# Patient Record
Sex: FEMALE | Race: WHITE | NOT HISPANIC OR LATINO | URBAN - METROPOLITAN AREA
[De-identification: names, ages, dates, MRNs, and addresses within clinical notes are randomized per-mention and may not be internally consistent; named-entity substitution may affect disease eponyms.]

---

## 2017-09-27 LAB
EXTERNAL ABO GROUPING: NORMAL
EXTERNAL CHLAMYDIA SCREEN: NEGATIVE
EXTERNAL GONORRHEA SCREEN: NEGATIVE
EXTERNAL HEMATOCRIT: 36.5 %
EXTERNAL HEMOGLOBIN: 12.6 G/DL
EXTERNAL HEPATITIS B SURFACE ANTIGEN: NORMAL
EXTERNAL HIV-1 ANTIBODY: NEGATIVE
EXTERNAL PLATELET COUNT: 310 K/ΜL
EXTERNAL RH FACTOR: NEGATIVE
EXTERNAL RUBELLA IGG QUANTITATION: NORMAL
EXTERNAL SYPHILIS RPR SCREEN: NORMAL

## 2018-03-09 PROCEDURE — 87653 STREP B DNA AMP PROBE: CPT | Performed by: OBSTETRICS & GYNECOLOGY

## 2018-03-10 ENCOUNTER — LAB REQUISITION (OUTPATIENT)
Dept: LAB | Facility: HOSPITAL | Age: 33
End: 2018-03-10
Payer: COMMERCIAL

## 2018-03-10 DIAGNOSIS — Z34.83 ENCOUNTER FOR SUPERVISION OF OTHER NORMAL PREGNANCY, THIRD TRIMESTER: ICD-10-CM

## 2018-03-11 LAB — GP B STREP DNA SPEC QL NAA+PROBE: ABNORMAL

## 2018-04-06 ENCOUNTER — HOSPITAL ENCOUNTER (INPATIENT)
Facility: HOSPITAL | Age: 33
LOS: 1 days | Discharge: HOME | DRG: 781 | End: 2018-04-06
Attending: OBSTETRICS & GYNECOLOGY | Admitting: OBSTETRICS & GYNECOLOGY
Payer: COMMERCIAL

## 2018-04-06 VITALS
TEMPERATURE: 98.1 F | RESPIRATION RATE: 18 BRPM | SYSTOLIC BLOOD PRESSURE: 120 MMHG | DIASTOLIC BLOOD PRESSURE: 82 MMHG | HEART RATE: 71 BPM

## 2018-04-06 DIAGNOSIS — Z3A.40 40 WEEKS GESTATION OF PREGNANCY: Primary | ICD-10-CM

## 2018-04-06 PROBLEM — O26.899 RH NEGATIVE STATE IN ANTEPARTUM PERIOD: Status: ACTIVE | Noted: 2018-04-06

## 2018-04-06 PROBLEM — Z34.93 PRENATAL CARE IN THIRD TRIMESTER: Status: ACTIVE | Noted: 2018-04-06

## 2018-04-06 PROBLEM — Z67.91 RH NEGATIVE STATE IN ANTEPARTUM PERIOD: Status: ACTIVE | Noted: 2018-04-06

## 2018-04-06 LAB
ABO GROUP BLD: NORMAL
BASOPHILS # BLD AUTO: 0.02 THOUSANDS/ΜL (ref 0–0.1)
BASOPHILS NFR BLD AUTO: 0 % (ref 0–1)
BLD GP AB SCN SERPL QL: NEGATIVE
EOSINOPHIL # BLD AUTO: 0.11 THOUSAND/ΜL (ref 0–0.61)
EOSINOPHIL NFR BLD AUTO: 1 % (ref 0–6)
ERYTHROCYTE [DISTWIDTH] IN BLOOD BY AUTOMATED COUNT: 15 % (ref 11.6–15.1)
EXTERNAL GROUP B STREP ANTIGEN: POSITIVE
HCT VFR BLD AUTO: 33.8 % (ref 34.8–46.1)
HGB BLD-MCNC: 11.6 G/DL (ref 11.5–15.4)
LYMPHOCYTES # BLD AUTO: 1.84 THOUSANDS/ΜL (ref 0.6–4.47)
LYMPHOCYTES NFR BLD AUTO: 22 % (ref 14–44)
MCH RBC QN AUTO: 28.3 PG (ref 26.8–34.3)
MCHC RBC AUTO-ENTMCNC: 34.3 G/DL (ref 31.4–37.4)
MCV RBC AUTO: 82 FL (ref 82–98)
MONOCYTES # BLD AUTO: 0.59 THOUSAND/ΜL (ref 0.17–1.22)
MONOCYTES NFR BLD AUTO: 7 % (ref 4–12)
NEUTROPHILS # BLD AUTO: 5.73 THOUSANDS/ΜL (ref 1.85–7.62)
NEUTS SEG NFR BLD AUTO: 70 % (ref 43–75)
PLATELET # BLD AUTO: 228 THOUSANDS/UL (ref 149–390)
PMV BLD AUTO: 11 FL (ref 8.9–12.7)
RBC # BLD AUTO: 4.1 MILLION/UL (ref 3.81–5.12)
RH BLD: NEGATIVE
SPECIMEN EXPIRATION DATE: NORMAL
WBC # BLD AUTO: 8.29 THOUSAND/UL (ref 4.31–10.16)

## 2018-04-06 PROCEDURE — 86900 BLOOD TYPING SEROLOGIC ABO: CPT | Performed by: OBSTETRICS & GYNECOLOGY

## 2018-04-06 PROCEDURE — 85025 COMPLETE CBC W/AUTO DIFF WBC: CPT | Performed by: OBSTETRICS & GYNECOLOGY

## 2018-04-06 PROCEDURE — 86850 RBC ANTIBODY SCREEN: CPT | Performed by: OBSTETRICS & GYNECOLOGY

## 2018-04-06 PROCEDURE — 86592 SYPHILIS TEST NON-TREP QUAL: CPT | Performed by: OBSTETRICS & GYNECOLOGY

## 2018-04-06 PROCEDURE — 3E0P7VZ INTRODUCTION OF HORMONE INTO FEMALE REPRODUCTIVE, VIA NATURAL OR ARTIFICIAL OPENING: ICD-10-PCS | Performed by: OBSTETRICS & GYNECOLOGY

## 2018-04-06 PROCEDURE — 3E033VJ INTRODUCTION OF OTHER HORMONE INTO PERIPHERAL VEIN, PERCUTANEOUS APPROACH: ICD-10-PCS | Performed by: OBSTETRICS & GYNECOLOGY

## 2018-04-06 PROCEDURE — 86901 BLOOD TYPING SEROLOGIC RH(D): CPT | Performed by: OBSTETRICS & GYNECOLOGY

## 2018-04-06 RX ORDER — OXYCODONE HYDROCHLORIDE AND ACETAMINOPHEN 5; 325 MG/1; MG/1
2 TABLET ORAL ONCE
Status: COMPLETED | OUTPATIENT
Start: 2018-04-06 | End: 2018-04-06

## 2018-04-06 RX ORDER — OXYTOCIN/RINGER'S LACTATE 30/500 ML
1-30 PLASTIC BAG, INJECTION (ML) INTRAVENOUS
Status: DISCONTINUED | OUTPATIENT
Start: 2018-04-06 | End: 2018-04-07 | Stop reason: HOSPADM

## 2018-04-06 RX ORDER — BUTORPHANOL TARTRATE 1 MG/ML
1 INJECTION, SOLUTION INTRAMUSCULAR; INTRAVENOUS ONCE
Status: COMPLETED | OUTPATIENT
Start: 2018-04-06 | End: 2018-04-06

## 2018-04-06 RX ORDER — ACETAMINOPHEN 325 MG/1
650 TABLET ORAL EVERY 6 HOURS PRN
Status: DISCONTINUED | OUTPATIENT
Start: 2018-04-06 | End: 2018-04-07 | Stop reason: HOSPADM

## 2018-04-06 RX ORDER — SODIUM CHLORIDE, SODIUM LACTATE, POTASSIUM CHLORIDE, CALCIUM CHLORIDE 600; 310; 30; 20 MG/100ML; MG/100ML; MG/100ML; MG/100ML
125 INJECTION, SOLUTION INTRAVENOUS CONTINUOUS
Status: DISCONTINUED | OUTPATIENT
Start: 2018-04-06 | End: 2018-04-07 | Stop reason: HOSPADM

## 2018-04-06 RX ADMIN — Medication 2 MILLI-UNITS/MIN: at 14:55

## 2018-04-06 RX ADMIN — SODIUM CHLORIDE, SODIUM LACTATE, POTASSIUM CHLORIDE, AND CALCIUM CHLORIDE 125 ML/HR: .6; .31; .03; .02 INJECTION, SOLUTION INTRAVENOUS at 18:43

## 2018-04-06 RX ADMIN — SODIUM CHLORIDE 5 MILLION UNITS: 0.9 INJECTION, SOLUTION INTRAVENOUS at 10:04

## 2018-04-06 RX ADMIN — BUTORPHANOL TARTRATE 1 MG: 1 INJECTION, SOLUTION INTRAMUSCULAR; INTRAVENOUS at 17:09

## 2018-04-06 RX ADMIN — OXYCODONE HYDROCHLORIDE AND ACETAMINOPHEN 2 TABLET: 5; 325 TABLET ORAL at 21:59

## 2018-04-06 RX ADMIN — SODIUM CHLORIDE, SODIUM LACTATE, POTASSIUM CHLORIDE, AND CALCIUM CHLORIDE 125 ML/HR: .6; .31; .03; .02 INJECTION, SOLUTION INTRAVENOUS at 09:20

## 2018-04-06 RX ADMIN — SODIUM CHLORIDE 2.5 MILLION UNITS: 9 INJECTION, SOLUTION INTRAVENOUS at 17:29

## 2018-04-06 RX ADMIN — ACETAMINOPHEN 650 MG: 325 TABLET, FILM COATED ORAL at 09:44

## 2018-04-06 RX ADMIN — SODIUM CHLORIDE 2.5 MILLION UNITS: 9 INJECTION, SOLUTION INTRAVENOUS at 13:30

## 2018-04-06 RX ADMIN — MISOPROSTOL 25 MCG: 100 TABLET ORAL at 10:55

## 2018-04-06 NOTE — OB LABOR/OXYTOCIN SAFETY PROGRESS
Labor Progress Note - Silvia Bush 28 y o  female MRN: [id number]    Unit/Bed#: L&D 323-01 Encounter: [id number]    Obstetric History       T1      L1     SAB0   TAB0   Ectopic0   Multiple0   Live Births0      Gestational Age: 36w2d  Dose (roberta-units/min) Oxytocin: 4 roberta-units/min  Contraction Frequency (minutes): 2  Contraction Quality: Moderate  Tachysystole: No   Dilation: 2        Effacement (%): 70  Station: -2  Baseline Rate: 125 bpm  FHR Category: Category I     Oxytocin Safety Progress Check: Safety check completed    Notes/comments:   Patient starting to feel contractions more strongly  Discussed pain management options including risks, benefits and alternatives and is requesting stadol at this time  FHT is category I  Cervical check the same at this time  Will give 1mg stadol IV once and continue pitocin titration per protocol      D/w Dr Eulogio Ortiz MD 2018 4:39 PM

## 2018-04-06 NOTE — OB LABOR/OXYTOCIN SAFETY PROGRESS
Labor progress note - Kamini Javed 28 y o  female MRN: [id number]    Unit/Bed#: L&D 323-01 Encounter: [id number]    Obstetric History       T1      L1     SAB0   TAB0   Ectopic0   Multiple0   Live Births0      Gestational Age: 36w2d     Contraction Frequency (minutes): 3-4  Contraction Quality: Mild  Tachysystole: No   Dilation: 2        Effacement (%): 70  Station: -2  Baseline Rate: 135 bpm  FHR Category: Category I     Notes/comments:   Patient is doing well- feeling contractions as tightening but does not want anything for pain at this time  Will start pitocin titration 2x2 milliunits Q 20 minutes per protocol  Will continue top monitor closely  Discussed with Dr Nicole Le MD 2018 2:25 PM

## 2018-04-06 NOTE — OB LABOR/OXYTOCIN SAFETY PROGRESS
Labor Progress Note - Keny Clonts 28 y o  female MRN: [id number]    Unit/Bed#: L&D 323-01 Encounter: [id number]    Obstetric History       T1      L1     SAB0   TAB0   Ectopic0   Multiple0   Live Births0      Gestational Age: 36w2d     Contraction Frequency (minutes): Irregular  Contraction Quality: Mild  Tachysystole: No   Dilation: 1-2        Effacement (%): 50  Station: -2  Baseline Rate: 140 bpm  Fetal Heart Rate: 140 BPM  FHR Category: Category I          Notes/comments:   Discussed low-dose pitocin for further ripening vs  Cytotec  Pt requests cytotec and repeat evaluation in 3 hours  Cytotec placed              Tiana Mcdonald DO 2018 10:58 AM

## 2018-04-06 NOTE — H&P
History & Physical - OB/GYN   Verenice Wheeler 28 y o  female MRN: [id number]  Unit/Bed#: L&D 323-01 Encounter: [id number]    86 y o   at 40w4d weeks  She is a patient of [facility]    Chief complaint:  I'm here for my induction    HPI:  Contractions:  Occasional [place] contractions  Fetal movement:  yes  Vaginal bleeding:   no  Leaking of fluid:  no      Pregnancy Complications:  Patient Active Problem List   Diagnosis    Prenatal care in third trimester    40 weeks gestation of pregnancy    Rh negative state in antepartum period       PMH:  No past medical history on file  PSH:  No past surgical history on file  OB Hx:  Obstetric History       T1      L1     SAB0   TAB0   Ectopic0   Multiple0   Live Births0       # Outcome Date GA Lbr Cristóbal/2nd Weight Sex Delivery Anes PTL Lv   2 Current            1 Term                   Meds:  No current facility-administered medications on file prior to encounter  No current outpatient prescriptions on file prior to encounter  Allergies:  No Known Allergies    Labs:  Blood type: O-  Antibody: unknown  Group B strep: positive  HIV: negative  Hepatitis B: negative  RPR: Nonreactive  Rubella: Not immune  Varicella Unknown  1 hour Glucose: Unknown    Physical Exam:  LMP 2017 (LMP Unknown)   Breastfeeding? Yes       Gen: AaOx3, NAD, pleasant  Pulm: No increased work of breathing  Abd: Gravid, nontender, nondistended  Extremities: No edema, nontender, Negative Olena's bilaterally    Estimated Fetal Weight: 8 lbs  Presentation: Vertex by ultrasound    SVE: 1-2 / 40% / -2  FHT:  135 / Moderate 6 - 25 bpm / +accels, -decels  [place]: Rare and irregular    Membranes: Intact    Assessment:   28 y o   at 40w4d weeks, here for elective induction of labor    Plan:   1  Admit to L&D  2  CBC, RPR, type and screen  3  GBS positive, will start PCN for GBS prophylaxis  4  Cytotec for cervical ripening  5  Clear liquid diet  6  Epidural upon request    Discussed with Dr Bartolome Phillip DO  OB/GYN, PGY2  4/6/2018, 10:47 AM

## 2018-04-07 ENCOUNTER — ANESTHESIA (INPATIENT)
Dept: LABOR AND DELIVERY | Facility: HOSPITAL | Age: 33
End: 2018-04-07
Payer: COMMERCIAL

## 2018-04-07 ENCOUNTER — HOSPITAL ENCOUNTER (INPATIENT)
Facility: HOSPITAL | Age: 33
LOS: 3 days | Discharge: HOME | End: 2018-04-10
Attending: OBSTETRICS & GYNECOLOGY | Admitting: OBSTETRICS & GYNECOLOGY
Payer: COMMERCIAL

## 2018-04-07 ENCOUNTER — ANESTHESIA EVENT (INPATIENT)
Dept: LABOR AND DELIVERY | Facility: HOSPITAL | Age: 33
End: 2018-04-07
Payer: COMMERCIAL

## 2018-04-07 DIAGNOSIS — Z3A.40 40 WEEKS GESTATION OF PREGNANCY: Primary | ICD-10-CM

## 2018-04-07 LAB
ABO GROUP BLD: NORMAL
BLD GP AB SCN SERPL QL: NEGATIVE
RH BLD: NEGATIVE
SPECIMEN EXPIRATION DATE: NORMAL

## 2018-04-07 PROCEDURE — 86901 BLOOD TYPING SEROLOGIC RH(D): CPT | Performed by: OBSTETRICS & GYNECOLOGY

## 2018-04-07 PROCEDURE — 4A1HXCZ MONITORING OF PRODUCTS OF CONCEPTION, CARDIAC RATE, EXTERNAL APPROACH: ICD-10-PCS | Performed by: OBSTETRICS & GYNECOLOGY

## 2018-04-07 PROCEDURE — 86850 RBC ANTIBODY SCREEN: CPT | Performed by: OBSTETRICS & GYNECOLOGY

## 2018-04-07 PROCEDURE — 99212 OFFICE O/P EST SF 10 MIN: CPT

## 2018-04-07 PROCEDURE — 86900 BLOOD TYPING SEROLOGIC ABO: CPT | Performed by: OBSTETRICS & GYNECOLOGY

## 2018-04-07 RX ORDER — ROPIVACAINE HYDROCHLORIDE 5 MG/ML
INJECTION, SOLUTION EPIDURAL; INFILTRATION; PERINEURAL AS NEEDED
Status: DISCONTINUED | OUTPATIENT
Start: 2018-04-07 | End: 2018-04-08 | Stop reason: SURG

## 2018-04-07 RX ORDER — ROPIVACAINE HYDROCHLORIDE 2 MG/ML
INJECTION, SOLUTION EPIDURAL; INFILTRATION; PERINEURAL AS NEEDED
Status: DISCONTINUED | OUTPATIENT
Start: 2018-04-07 | End: 2018-04-08 | Stop reason: SURG

## 2018-04-07 RX ORDER — LIDOCAINE HYDROCHLORIDE AND EPINEPHRINE 15; 5 MG/ML; UG/ML
INJECTION, SOLUTION EPIDURAL AS NEEDED
Status: DISCONTINUED | OUTPATIENT
Start: 2018-04-07 | End: 2018-04-08 | Stop reason: SURG

## 2018-04-07 RX ORDER — LIDOCAINE HYDROCHLORIDE AND EPINEPHRINE 20; 5 MG/ML; UG/ML
INJECTION, SOLUTION EPIDURAL; INFILTRATION; INTRACAUDAL; PERINEURAL AS NEEDED
Status: DISCONTINUED | OUTPATIENT
Start: 2018-04-07 | End: 2018-04-08 | Stop reason: SURG

## 2018-04-07 RX ORDER — FENTANYL CITRATE 50 UG/ML
INJECTION, SOLUTION INTRAMUSCULAR; INTRAVENOUS
Status: COMPLETED
Start: 2018-04-07 | End: 2018-04-07

## 2018-04-07 RX ORDER — SODIUM CHLORIDE, SODIUM LACTATE, POTASSIUM CHLORIDE, CALCIUM CHLORIDE 600; 310; 30; 20 MG/100ML; MG/100ML; MG/100ML; MG/100ML
125 INJECTION, SOLUTION INTRAVENOUS CONTINUOUS
Status: DISCONTINUED | OUTPATIENT
Start: 2018-04-07 | End: 2018-04-08

## 2018-04-07 RX ORDER — FENTANYL CITRATE 50 UG/ML
INJECTION, SOLUTION INTRAMUSCULAR; INTRAVENOUS AS NEEDED
Status: DISCONTINUED | OUTPATIENT
Start: 2018-04-07 | End: 2018-04-08 | Stop reason: SURG

## 2018-04-07 RX ORDER — ONDANSETRON 2 MG/ML
4 INJECTION INTRAMUSCULAR; INTRAVENOUS EVERY 6 HOURS PRN
Status: DISCONTINUED | OUTPATIENT
Start: 2018-04-07 | End: 2018-04-08

## 2018-04-07 RX ORDER — OXYTOCIN/RINGER'S LACTATE 30/500 ML
1-30 PLASTIC BAG, INJECTION (ML) INTRAVENOUS
Status: DISCONTINUED | OUTPATIENT
Start: 2018-04-07 | End: 2018-04-08

## 2018-04-07 RX ADMIN — SODIUM CHLORIDE 5 MILLION UNITS: 0.9 INJECTION, SOLUTION INTRAVENOUS at 01:55

## 2018-04-07 RX ADMIN — ROPIVACAINE HYDROCHLORIDE 7 ML: 5 INJECTION, SOLUTION EPIDURAL; INFILTRATION; PERINEURAL at 20:02

## 2018-04-07 RX ADMIN — LIDOCAINE HYDROCHLORIDE,EPINEPHRINE BITARTRATE 5 ML: 20; .005 INJECTION, SOLUTION EPIDURAL; INFILTRATION; INTRACAUDAL; PERINEURAL at 14:49

## 2018-04-07 RX ADMIN — SODIUM CHLORIDE 2.5 MILLION UNITS: 9 INJECTION, SOLUTION INTRAVENOUS at 22:12

## 2018-04-07 RX ADMIN — SODIUM CHLORIDE, SODIUM LACTATE, POTASSIUM CHLORIDE, AND CALCIUM CHLORIDE 999 ML/HR: .6; .31; .03; .02 INJECTION, SOLUTION INTRAVENOUS at 01:23

## 2018-04-07 RX ADMIN — ROPIVACAINE HYDROCHLORIDE 6 ML: 2 INJECTION, SOLUTION EPIDURAL; INFILTRATION at 02:30

## 2018-04-07 RX ADMIN — ROPIVACAINE HYDROCHLORIDE 7 ML: 5 INJECTION, SOLUTION EPIDURAL; INFILTRATION; PERINEURAL at 22:35

## 2018-04-07 RX ADMIN — ROPIVACAINE HYDROCHLORIDE: 2 INJECTION, SOLUTION EPIDURAL; INFILTRATION at 17:38

## 2018-04-07 RX ADMIN — SODIUM CHLORIDE 2.5 MILLION UNITS: 9 INJECTION, SOLUTION INTRAVENOUS at 10:30

## 2018-04-07 RX ADMIN — FENTANYL CITRATE 50 MCG: 50 INJECTION, SOLUTION INTRAMUSCULAR; INTRAVENOUS at 23:00

## 2018-04-07 RX ADMIN — ROPIVACAINE HYDROCHLORIDE 5 ML: 2 INJECTION, SOLUTION EPIDURAL; INFILTRATION at 02:25

## 2018-04-07 RX ADMIN — SODIUM CHLORIDE, SODIUM LACTATE, POTASSIUM CHLORIDE, AND CALCIUM CHLORIDE 999 ML/HR: .6; .31; .03; .02 INJECTION, SOLUTION INTRAVENOUS at 02:33

## 2018-04-07 RX ADMIN — ONDANSETRON 4 MG: 2 INJECTION INTRAMUSCULAR; INTRAVENOUS at 19:55

## 2018-04-07 RX ADMIN — ROPIVACAINE HYDROCHLORIDE: 2 INJECTION, SOLUTION EPIDURAL; INFILTRATION at 02:32

## 2018-04-07 RX ADMIN — LIDOCAINE HYDROCHLORIDE AND EPINEPHRINE 3 ML: 15; 5 INJECTION, SOLUTION EPIDURAL at 02:22

## 2018-04-07 RX ADMIN — SODIUM CHLORIDE 2.5 MILLION UNITS: 9 INJECTION, SOLUTION INTRAVENOUS at 14:22

## 2018-04-07 RX ADMIN — SODIUM CHLORIDE 2.5 MILLION UNITS: 9 INJECTION, SOLUTION INTRAVENOUS at 17:50

## 2018-04-07 RX ADMIN — SODIUM CHLORIDE, SODIUM LACTATE, POTASSIUM CHLORIDE, AND CALCIUM CHLORIDE 125 ML/HR: .6; .31; .03; .02 INJECTION, SOLUTION INTRAVENOUS at 08:10

## 2018-04-07 RX ADMIN — Medication 2 MILLI-UNITS/MIN: at 10:32

## 2018-04-07 RX ADMIN — FENTANYL CITRATE 50 MCG: 50 INJECTION, SOLUTION INTRAMUSCULAR; INTRAVENOUS at 20:02

## 2018-04-07 RX ADMIN — LIDOCAINE HYDROCHLORIDE,EPINEPHRINE BITARTRATE 5 ML: 20; .005 INJECTION, SOLUTION EPIDURAL; INFILTRATION; INTRACAUDAL; PERINEURAL at 17:55

## 2018-04-07 RX ADMIN — ONDANSETRON 4 MG: 2 INJECTION INTRAMUSCULAR; INTRAVENOUS at 03:05

## 2018-04-07 RX ADMIN — SODIUM CHLORIDE 2.5 MILLION UNITS: 9 INJECTION, SOLUTION INTRAVENOUS at 06:23

## 2018-04-07 RX ADMIN — SODIUM CHLORIDE, SODIUM LACTATE, POTASSIUM CHLORIDE, AND CALCIUM CHLORIDE 125 ML/HR: .6; .31; .03; .02 INJECTION, SOLUTION INTRAVENOUS at 16:50

## 2018-04-07 RX ADMIN — ROPIVACAINE HYDROCHLORIDE: 2 INJECTION, SOLUTION EPIDURAL; INFILTRATION at 11:05

## 2018-04-07 NOTE — OB LABOR/OXYTOCIN SAFETY PROGRESS
Oxytocin Safety Progress Check Note - Renu Marshall 28 y o  female MRN: [id number]    Unit/Bed#: L&D 323-01 Encounter: [id number]    Obstetric History       T1      L1     SAB0   TAB0   Ectopic0   Multiple0   Live Births0      Gestational Age: 39w6d  Dose (roberta-units/min) Oxytocin: 22 roberta-units/min  Contraction Frequency (minutes): 2-3  Contraction Quality: Moderate  Tachysystole: No   Dilation: 5-6        Effacement (%): 90  Station: -1  Baseline Rate: 145 bpm  Fetal Heart Rate: 140 BPM  FHR Category: Category I          Notes/comments:     She is making cervical change with pit at 22mU/min  She is shaky and feels more pressure now  Fetal vertex has dropped in station from -2 to -1  Will continue pit at 22mU/min and check in 2 hours  FHT shows baseline of 150 with intermittent variable decelerations, intermittent early decelerations, moderate variability, and 15x15 accelerations  Will continue to closely monitor      D/w Dr Lamberto Jorge MD 2018 4:45 PM

## 2018-04-07 NOTE — OB LABOR/OXYTOCIN SAFETY PROGRESS
Oxytocin Safety Progress Check Note - Brittaney Marroquin 28 y o  female MRN: [id number]    Unit/Bed#: L&D 323-01 Encounter: [id number]    Obstetric History       T1      L1     SAB0   TAB0   Ectopic0   Multiple0   Live Births0      Gestational Age: 36w2d  Dose (roberta-units/min) Oxytocin: 14 roberta-units/min  Contraction Frequency (minutes): 1-2  Contraction Quality: Mild  Tachysystole: Yes   Dilation: 2-3        Effacement (%): 50  Station: -2  Baseline Rate: 120 bpm  Fetal Heart Rate: 140 BPM  FHR Category: Category I     Oxytocin Safety Progress Check: Safety check completed    Notes/comments:     Patient getting more uncomfortable, but making minimal cervical change  Will consider another dose of cytotec vs continued pitocin titration  Patient was tachysystole, so pitocin was turned down              Rose Chandler MD 2018 8:24 PM

## 2018-04-07 NOTE — OB LABOR/OXYTOCIN SAFETY PROGRESS
Labor Progress Note - Keny Guzmants 28 y o  female MRN: [id number]    Unit/Bed#: L&D 323-01 Encounter: [id number]    Obstetric History       T1      L1     SAB0   TAB0   Ectopic0   Multiple0   Live Births0      Gestational Age: 39w6d     Contraction Frequency (minutes): 2 5-6 5  Contraction Quality: Mild  Tachysystole: No   Dilation: 4        Effacement (%): 70  Station: -2  Baseline Rate: 130 bpm  Fetal Heart Rate: 140 BPM  FHR Category: Category I          Late entry due to events on floor    Notes/comments:     Patient had a four minute prolonged deceleration at 0745 that resolved with maternal repositioning  She did not make cervical change  Pitocin order was placed at 0715 as she has not made cervical change since 0600 but was not started  Patient then had another 3 minute prolonged deceleration at 0850 that resolved with maternal repositioning only  Will hold off on starting pitocin given intermittent decelerations  Otherwise, strip has moderate variability with accelerations present   Will re-assess starting pitocin at next cervical exam      D/w Dr Taco Nieto MD 2018 8:54 AM

## 2018-04-07 NOTE — OB LABOR/OXYTOCIN SAFETY PROGRESS
Oxytocin Safety Progress Check Note - Jessy Forsgate 28 y o  female MRN: [id number]    Unit/Bed#: L&D 323-01 Encounter: [id number]    Obstetric History       T1      L1     SAB0   TAB0   Ectopic0   Multiple0   Live Births0      Gestational Age: 39w6d  Dose (roberta-units/min) Oxytocin: 22 roberta-units/min  Contraction Frequency (minutes): 2 5-3  Contraction Quality: Moderate  Tachysystole: No   Dilation: 5        Effacement (%): 90  Station: -2  Baseline Rate: 145 bpm  Fetal Heart Rate: 140 BPM  FHR Category: Category I          Notes/comments:     Patient made cervical change from 4 to 5cm and has thinned out from 70 to 90% in effacement  Pit is at 22mU/min, ctx every 3 minutes  CAT I tracing with early decelerations present  Will keep pit at 22 since she is making change    She is comfortable with epidural     D/w Dr Casey Stuart MD 2018 3:57 PM

## 2018-04-07 NOTE — OB LABOR/OXYTOCIN SAFETY PROGRESS
Labor Progress Note - Huber Shoemaker 28 y o  female MRN: [id number]    Unit/Bed#: L&D 323-01 Encounter: [id number]    Obstetric History       T1      L1     SAB0   TAB0   Ectopic0   Multiple0   Live Births0      Gestational Age: 39w6d     Contraction Frequency (minutes): 4-7  Contraction Quality: Mild  Tachysystole: No   Dilation: 4        Effacement (%): 70  Station: -2  Baseline Rate: 125 bpm  Fetal Heart Rate: 140 BPM  FHR Category: Category II          Notes/comments:     Patient remains comfortable with epidural, ctx have spaced out, recommend starting pitocin titration  FHT category 2 secondary to intermittent variable decelerations   Overall reactive            Amada Gaspar MD 2018 7:23 AM

## 2018-04-07 NOTE — OB LABOR/OXYTOCIN SAFETY PROGRESS
Oxytocin Safety Progress Check Note - Joe Garcia 28 y o  female MRN: [id number]    Unit/Bed#: L&D 323-01 Encounter: [id number]    Obstetric History       T1      L1     SAB0   TAB0   Ectopic0   Multiple0   Live Births0      Gestational Age: 39w6d  Dose (roberta-units/min) Oxytocin: 10 roberta-units/min  Contraction Frequency (minutes): 4-5  Contraction Quality: Mild  Tachysystole: No   Dilation: 4        Effacement (%): 70  Station: -2  Baseline Rate: 135 bpm  Fetal Heart Rate: 140 BPM  FHR Category: Category I          Notes/comments:     Patient not making cervical change  She is comfortable with epidural  Will continue titrating pitocin and monitoring FHT   FHT currently CAT I     D/w Dr Rodney Hong MD 2018 12:23 PM

## 2018-04-07 NOTE — OB LABOR/OXYTOCIN SAFETY PROGRESS
Oxytocin Safety Progress Check Note - Diego Manner 28 y o  female MRN: [id number]    Unit/Bed#: L&D 323-01 Encounter: [id number]    Obstetric History       T1      L1     SAB0   TAB0   Ectopic0   Multiple0   Live Births0      Gestational Age: 39w6d  Dose (roberta-units/min) Oxytocin: 16 roberta-units/min  Contraction Frequency (minutes): 2 5-3  Contraction Quality: Moderate  Tachysystole: No   Dilation: 6        Effacement (%): 90  Station: -1  Baseline Rate: 155 bpm  Fetal Heart Rate: 140 BPM  FHR Category: Category I          Notes/comments:     FHT shows recurrent variable decelerations starting at 1645, with moderate variability, 15x15 accelerations, and intermittent early decelerations  IUPC was placed for amnioinfusion  Will start with 500cc LR bolus and reassess after completed  Will decrease pitocin to 16mU/min       D/w Dr Meri Carranza MD 2018 5:14 PM

## 2018-04-07 NOTE — OB LABOR/OXYTOCIN SAFETY PROGRESS
Oxytocin Safety Progress Check Note - Silvia Bush 28 y o  female MRN: [id number]    Unit/Bed#: L&D 323-01 Encounter: [id number]    Obstetric History       T1      L1     SAB0   TAB0   Ectopic0   Multiple0   Live Births0      Gestational Age: 39w6d     Contraction Frequency (minutes): 2 5-6  Contraction Quality: Mild  Tachysystole: No   Dilation: 4        Effacement (%): 70  Station: -2  Baseline Rate: 135 bpm  Fetal Heart Rate: 140 BPM  FHR Category: Category I          Notes/comments:     Patient has not made cervical change  Will start pitocin titration now with FHT monitoring  FHT shows moderate variability with baseline of 130, 15x15 episodic accelerations  She had 1 intermittent variable deceleration at 1025 that resolved spontaneously  If variable decelerations become more recurrent, will place IUPC for amnioinfusion    She is comfortable with epidural     D/w Dr Danni Purvis MD 2018 10:27 AM

## 2018-04-07 NOTE — OB LABOR/OXYTOCIN SAFETY PROGRESS
Oxytocin Safety Progress Note - Thor Smoke 28 y o  female MRN: [id number]    Unit/Bed#: L&D 323-01 Encounter: [id number]    Obstetric History       T1      L1     SAB0   TAB0   Ectopic0   Multiple0   Live Births0      Gestational Age: 39w6d  Dose (roberta-units/min) Oxytocin: 16 roberta-units/min  Contraction Frequency (minutes): 3 5-4  Contraction Quality: Mild  Tachysystole: No   Dilation: 4        Effacement (%): 70  Station: -2  Baseline Rate: 135 bpm  Fetal Heart Rate: 140 BPM  FHR Category: Category I          Notes/comments:     Patient has not made cervical change with pitocin at 14mU/min  She is shania every 2-4 minutes  Will continue pitocin titration with FHT monitoring  FHT Cat 1  If unchanged at next cervical check, will place IUPC      D/w Dr Vero Miller MD 2018 2:06 PM

## 2018-04-07 NOTE — ANESTHESIA PREPROCEDURE EVALUATION
Review of Systems/Medical History  Patient summary reviewed  Chart reviewed      Cardiovascular  Negative cardio ROS    Pulmonary  Negative pulmonary ROS        GI/Hepatic  Negative GI/hepatic ROS          Negative  ROS        Endo/Other  Negative endo/other ROS      GYN  Currently pregnant ,          Hematology  Negative hematology ROS      Musculoskeletal  Negative musculoskeletal ROS        Neurology  Negative neurology ROS      Psychology   Negative psychology ROS              Physical Exam    Airway    Mallampati score: II  TM Distance: >3 FB  Neck ROM: full     Dental   No notable dental hx     Cardiovascular  Comment: Negative ROS, Rhythm: regular, Rate: normal, Cardiovascular exam normal    Pulmonary  Pulmonary exam normal Breath sounds clear to auscultation,     Other Findings        Anesthesia Plan  ASA Score- 2     Anesthesia Type- epidural with ASA Monitors  Additional Monitors:   Airway Plan:         Plan Factors-    Induction-     Postoperative Plan-     Informed Consent- Anesthetic plan and risks discussed with patient

## 2018-04-07 NOTE — H&P
H&P Exam - Obstetrics   Keny Alfonso 28 y o  female MRN: [id number]  Unit/Bed#: L&D 323-01 Encounter: [id number]    Assessment/Plan     Assessment:  28year old  at 43 weeks and 5 days admitted in labor with SROM  GBS positive  Rh negative    Plan:  1  Admit for labor with SROM  -expectant management  -augmentation if necessary  2  GBS positive  -PCN on admission  3  Intrapartum pain management  -epidural on request    History of Present Illness   Chief Complaint: I broke my water    HPI:  Keny Alfonso is a 28 y o   female with an IGNACIO of 2018, by Last Menstrual Period at 40w5d weeks gestation who is being admitted for labor with SROM  Her current obstetrical history is significant for GBS colonizer  Contractions: Frequency: Every 2-4 minutes  Leakage of fluid: onset: 0000  Bleeding: None  Fetal movement: present  Pregnancy complications: none  Review of Systems   Constitutional: Negative for chills and fever  Eyes: Negative for visual disturbance  Respiratory: Negative for chest tightness and shortness of breath  Cardiovascular: Negative for chest pain  Gastrointestinal: Negative for abdominal pain, nausea and vomiting  Genitourinary: Negative for vaginal bleeding  Neurological: Negative for dizziness and headaches  Historical Information   OB History    Para Term  AB Living   2 1 1 0 0 1   SAB TAB Ectopic Multiple Live Births                  # Outcome Date GA Lbr Cristóbal/2nd Weight Sex Delivery Anes PTL Lv   2 Current            1 Term                 Baby complications/comments: none  No past medical history on file  No past surgical history on file    Social History   History   Alcohol use Not on file     History   Drug use: Unknown     History   Smoking Status    Not on file   Smokeless Tobacco    Not on file     Family History: non-contributory    Meds/Allergies   all medications and allergies reviewed  No Known Allergies    Objective   Vitals: Blood pressure 140/86, pulse 105, temperature 98 2 °F (36 8 °C), temperature source Oral, resp  rate 18, height 5' 4" (1 626 m), weight 89 8 kg (198 lb), last menstrual period 06/26/2017, SpO2 98 %, not currently breastfeeding  Body mass index is 33 99 kg/m²  Invasive Devices          No matching active lines, drains, or airways          Physical Exam   Constitutional: She is oriented to person, place, and time  She appears well-developed and well-nourished  No distress  HENT:   Head: Normocephalic and atraumatic  Neck: Normal range of motion  Cardiovascular: Normal rate  Pulmonary/Chest: Effort normal  Right breast exhibits no mass  Left breast exhibits no mass  Abdominal: Soft  There is no tenderness  Genitourinary: Vagina normal and uterus normal    Genitourinary Comments: Grossly ruptured and leaking clear fluid   Musculoskeletal: Normal range of motion  Neurological: She is alert and oriented to person, place, and time  Skin: Skin is warm and dry  She is not diaphoretic  Psychiatric: She has a normal mood and affect   Her behavior is normal        SVE: 3/70/-2    Prenatal Labs:   Blood Type:   Lab Results   Component Value Date/Time    ABO Grouping O 04/06/2018 09:40 AM    ABO Grouping O 09/27/2017     , D (Rh type):   Lab Results   Component Value Date/Time    Rh Factor Negative 04/06/2018 09:40 AM     , Antibody Screen:   Lab Results   Component Value Date/Time    Antibody Screen Negative 04/06/2018 09:40 AM    , HCT/HGB:   Lab Results   Component Value Date/Time    Hematocrit 33 8 (L) 04/06/2018 09:39 AM    Hemoglobin 11 6 04/06/2018 09:39 AM      , Platelets:   Lab Results   Component Value Date/Time    Platelets 611 48/89/3015 09:39 AM     Rubella:  Non immune   VDRL/RPR: non reactive  ,   Hep B:   Lab Results   Component Value Date/Time    External Hepatitis B Surface Ag neg 09/27/2017     , HIV:   Lab Results   Component Value Date/Time    External HIV-1 Antibody Negative 09/27/2017     , Chlamydia:   Lab Results   Component Value Date/Time    External Chlamydia Screen Negative 09/27/2017     , Gonorrhea:   Lab Results   Component Value Date/Time    External Gonorrhea Screen Negative 09/27/2017     , Group B Strep:    Lab Results   Component Value Date/Time    External Strep Group B Ag Positive (A) 04/06/2018          Imaging, EKG, Pathology, and Other Studies: I have personally reviewed pertinent reports

## 2018-04-07 NOTE — OB LABOR/OXYTOCIN SAFETY PROGRESS
Oxytocin Safety Progress Check Note - Devjim Esteban 28 y o  female MRN: [id number]    Unit/Bed#: L&D 323-01 Encounter: [id number]    Obstetric History       T1      L1     SAB0   TAB0   Ectopic0   Multiple0   Live Births0      Gestational Age: 39w6d  Dose (roberta-units/min) Oxytocin: 8 roberta-units/min  Contraction Frequency (minutes): 2-3 5  Contraction Quality: Strong  Tachysystole: No   Dilation: 7        Effacement (%): 90  Station: -1  Baseline Rate: 160 bpm  Fetal Heart Rate: 140 BPM  FHR Category: Category I          Notes/comments:     Making slow but progressive change  Peanut ball in place  Continue monitoring FHT which currently shows moderate variability, accelerations, and intermittent variable decelerations      D/w Dr Diana Bernal MD 2018 7:45 PM

## 2018-04-07 NOTE — PROGRESS NOTES
Spoke with patient and - options given to patient  Patient decided to have pitocin stopped and go home to wait for labor

## 2018-04-07 NOTE — ANESTHESIA PROCEDURE NOTES
Epidural Block    Patient location during procedure: OB  Start time: 4/7/2018 2:10 AM  Reason for block: at surgeon's request and primary anesthetic  Staffing  Anesthesiologist: Erin Sánchez  Performed: anesthesiologist   Preanesthetic Checklist  Completed: patient identified, site marked, surgical consent, pre-op evaluation, timeout performed, IV checked, risks and benefits discussed and monitors and equipment checked  Epidural  Patient position: sitting  Prep: Betadine  Patient monitoring: heart rate, continuous pulse ox and frequent blood pressure checks  Approach: midline  Location: lumbar (1-5)  Injection technique: SAMM saline  Needle  Needle type: Tuohy   Needle gauge: 18 G  Catheter type: side hole  Catheter size: 20 G  Catheter at skin depth: 11 5 cm  Test dose: negative and lidocaine 1 5% with epinephrine 1-to-200,000negative aspiration for CSF, negative aspiration for heme and no paresthesia on injection  patient tolerated the procedure well with no immediate complications

## 2018-04-07 NOTE — OB LABOR/OXYTOCIN SAFETY PROGRESS
Labor Progress Note - Brittaney Marroquin 28 y o  female MRN: [id number]    Unit/Bed#: L&D 323-01 Encounter: [id number]    Obstetric History       T1      L1     SAB0   TAB0   Ectopic0   Multiple0   Live Births0      Gestational Age: 39w6d     Contraction Frequency (minutes): 5         Dilation: 3        Effacement (%): 70  Station: -2  Baseline Rate: 130 bpm                Notes/comments:     Patient comfortable with epidural at this time  Will defer cervical exam as patient is resting    [facility] category I            Rose Chandler MD 2018 4:16 AM

## 2018-04-08 LAB
ABO GROUP BLD: NORMAL
BASE EXCESS BLDCOA CALC-SCNC: -9.5 MMOL/L (ref 3–11)
BASE EXCESS BLDCOV CALC-SCNC: -6.2 MMOL/L (ref 1–9)
BLD GP AB SCN SERPL QL: NEGATIVE
FETAL CELL SCN BLD QL ROSETTE: NEGATIVE
HCO3 BLDCOA-SCNC: 20.5 MMOL/L (ref 17.3–27.3)
HCO3 BLDCOV-SCNC: 20.9 MMOL/L (ref 12.2–28.6)
O2 CT VFR BLDCOA CALC: 8.4 ML/DL
OXYHGB MFR BLDCOA: 36.2 %
OXYHGB MFR BLDCOV: 51.5 %
PCO2 BLDCOA: 61.2 MM[HG] (ref 30–60)
PCO2 BLDCOV: 46.8 MM HG (ref 27–43)
PH BLDCOA: 7.14 [PH] (ref 7.23–7.43)
PH BLDCOV: 7.27 [PH] (ref 7.19–7.49)
PO2 BLDCOA: 22.4 MM HG (ref 5–25)
PO2 BLDCOV: 25.3 MM HG (ref 15–45)
RH BLD: NEGATIVE
SAO2 % BLDCOV: 11.7 ML/DL

## 2018-04-08 PROCEDURE — 85461 HEMOGLOBIN FETAL: CPT | Performed by: OBSTETRICS & GYNECOLOGY

## 2018-04-08 PROCEDURE — 86901 BLOOD TYPING SEROLOGIC RH(D): CPT | Performed by: OBSTETRICS & GYNECOLOGY

## 2018-04-08 PROCEDURE — 86850 RBC ANTIBODY SCREEN: CPT | Performed by: OBSTETRICS & GYNECOLOGY

## 2018-04-08 PROCEDURE — 82805 BLOOD GASES W/O2 SATURATION: CPT | Performed by: OBSTETRICS & GYNECOLOGY

## 2018-04-08 PROCEDURE — 86900 BLOOD TYPING SEROLOGIC ABO: CPT | Performed by: OBSTETRICS & GYNECOLOGY

## 2018-04-08 PROCEDURE — 0KQM0ZZ REPAIR PERINEUM MUSCLE, OPEN APPROACH: ICD-10-PCS | Performed by: OBSTETRICS & GYNECOLOGY

## 2018-04-08 RX ORDER — OXYCODONE HYDROCHLORIDE AND ACETAMINOPHEN 5; 325 MG/1; MG/1
2 TABLET ORAL EVERY 4 HOURS PRN
Status: DISCONTINUED | OUTPATIENT
Start: 2018-04-08 | End: 2018-04-10 | Stop reason: HOSPADM

## 2018-04-08 RX ORDER — ACETAMINOPHEN 325 MG/1
650 TABLET ORAL EVERY 4 HOURS PRN
Status: DISCONTINUED | OUTPATIENT
Start: 2018-04-08 | End: 2018-04-10 | Stop reason: HOSPADM

## 2018-04-08 RX ORDER — IBUPROFEN 600 MG/1
600 TABLET ORAL EVERY 4 HOURS PRN
Status: DISCONTINUED | OUTPATIENT
Start: 2018-04-08 | End: 2018-04-10 | Stop reason: HOSPADM

## 2018-04-08 RX ORDER — SIMETHICONE 80 MG
80 TABLET,CHEWABLE ORAL 4 TIMES DAILY PRN
Status: DISCONTINUED | OUTPATIENT
Start: 2018-04-08 | End: 2018-04-10 | Stop reason: HOSPADM

## 2018-04-08 RX ORDER — DOCUSATE SODIUM 100 MG/1
100 CAPSULE, LIQUID FILLED ORAL 2 TIMES DAILY
Status: DISCONTINUED | OUTPATIENT
Start: 2018-04-08 | End: 2018-04-10 | Stop reason: HOSPADM

## 2018-04-08 RX ORDER — MAGNESIUM HYDROXIDE/ALUMINUM HYDROXICE/SIMETHICONE 120; 1200; 1200 MG/30ML; MG/30ML; MG/30ML
15 SUSPENSION ORAL EVERY 6 HOURS PRN
Status: DISCONTINUED | OUTPATIENT
Start: 2018-04-08 | End: 2018-04-10 | Stop reason: HOSPADM

## 2018-04-08 RX ORDER — DIAPER,BRIEF,INFANT-TODD,DISP
1 EACH MISCELLANEOUS AS NEEDED
Status: DISCONTINUED | OUTPATIENT
Start: 2018-04-08 | End: 2018-04-10 | Stop reason: HOSPADM

## 2018-04-08 RX ORDER — KETOROLAC TROMETHAMINE 30 MG/ML
30 INJECTION, SOLUTION INTRAMUSCULAR; INTRAVENOUS ONCE
Status: COMPLETED | OUTPATIENT
Start: 2018-04-08 | End: 2018-04-08

## 2018-04-08 RX ORDER — DIPHENHYDRAMINE HCL 25 MG
25 TABLET ORAL EVERY 6 HOURS PRN
Status: DISCONTINUED | OUTPATIENT
Start: 2018-04-08 | End: 2018-04-10 | Stop reason: HOSPADM

## 2018-04-08 RX ORDER — ONDANSETRON 2 MG/ML
4 INJECTION INTRAMUSCULAR; INTRAVENOUS EVERY 8 HOURS PRN
Status: DISCONTINUED | OUTPATIENT
Start: 2018-04-08 | End: 2018-04-10 | Stop reason: HOSPADM

## 2018-04-08 RX ORDER — CALCIUM CARBONATE 200(500)MG
1000 TABLET,CHEWABLE ORAL DAILY PRN
Status: DISCONTINUED | OUTPATIENT
Start: 2018-04-08 | End: 2018-04-10 | Stop reason: HOSPADM

## 2018-04-08 RX ADMIN — KETOROLAC TROMETHAMINE 30 MG: 30 INJECTION, SOLUTION INTRAMUSCULAR at 02:52

## 2018-04-08 RX ADMIN — HUMAN RHO(D) IMMUNE GLOBULIN 300 MCG: 300 INJECTION, SOLUTION INTRAMUSCULAR at 20:38

## 2018-04-08 RX ADMIN — DOCUSATE SODIUM 100 MG: 100 CAPSULE, LIQUID FILLED ORAL at 16:55

## 2018-04-08 RX ADMIN — DOCUSATE SODIUM 100 MG: 100 CAPSULE, LIQUID FILLED ORAL at 12:15

## 2018-04-08 RX ADMIN — IBUPROFEN 600 MG: 600 TABLET ORAL at 12:15

## 2018-04-08 RX ADMIN — BENZOCAINE AND LEVOMENTHOL 1 APPLICATION: 200; 5 SPRAY TOPICAL at 08:11

## 2018-04-08 RX ADMIN — IBUPROFEN 600 MG: 600 TABLET ORAL at 20:57

## 2018-04-08 RX ADMIN — IBUPROFEN 600 MG: 600 TABLET ORAL at 08:08

## 2018-04-08 RX ADMIN — IBUPROFEN 600 MG: 600 TABLET ORAL at 16:55

## 2018-04-08 NOTE — OB LABOR/OXYTOCIN SAFETY PROGRESS
Oxytocin Safety Progress Check Note - Elaine Books 28 y o  female MRN: [id number]    Unit/Bed#: L&D 323-01 Encounter: [id number]    Obstetric History       T1      L1     SAB0   TAB0   Ectopic0   Multiple0   Live Births0      Gestational Age: 38w9d  Dose (roberta-units/min) Oxytocin: 8 roberta-units/min  Contraction Frequency (minutes): 3 5-4  Contraction Quality: Strong  Tachysystole: No   Dilation: 10  Dilation Complete Date: 18  Dilation Complete Time: 42  Effacement (%): 100  Station: 1  Baseline Rate: 155 bpm  Fetal Heart Rate: 140 BPM  FHR Category: Category I          Notes/comments: Instructed on pushing  Good effort with pushes    Anticipate ROBER Coffman DO 2018 12:48 AM

## 2018-04-08 NOTE — L&D DELIVERY NOTE
Vaginal Delivery Summary - OB/GYN   Elmon Prader 28 y o  female MRN: [id number]  Unit/Bed#: L&D 323-01 Encounter: [id number]          Predelivery Diagnosis:  1  Pregnancy at 40w6d  2  GBS positive status  3  Rh negative status    Postdelivery Diagnosis:  1  Same as above  2  Delivery of term     Procedure: Spontaneous Vaginal Delivery     Attending: Dr Mark Mckee    Assistant: Princess Gonsalves    Anesthesia: Epidural    EBL: 300cc  Admission H 6  Admission platelets: 695    Complications: none apparent    Specimens: cord blood, arterial and venous cord blood gasses, placenta to storage    Findings:   1  Viable male at 36, with APGARS of 8 and 9 at 1 and 5 minutes respectively,  2  Spontaneous delivery of intact placenta at 0149  3  2 degree laceration repaired with 3-0 vicryl rapide on a CT  4  Blood gases:   Arterial pH: 7 142   Arterial base excess: -9 5   Venous pH: 7 267   Venous base excess: -6 2    Disposition:  Patient tolerated the procedure well and was recovering in labor and delivery room     Brief history and labor course:  Ms Elmon Prader is a 28 y o   at 40wk5d  She was admitted to labor and delivery for induction of labor on   Penicillin was started for GBS prophylaxis  She made minimal change from 1-2/50/-2 to 2-3/50/-2 after 1 dose of cytotec and pitocin titration to 14mU/min  At that point, she was given the option to go home and wait for labor vs pit break  She decided to go home and was discharged around 2200  At midnight on , two hours later, she spontaneously ruptured at home  She was admitted to labor and delivery for premature rupture of membranes, with SVE at 370/-2  She was restarted on penicillin for GBS prophylaxis  She was started on pitocin titration after not making change with expectant management  An IUPC was placed and amnioinfusion was started due to recurrent variable decelerations during labor  She became complete at [facility]  and started pushing at [facility]  Description of procedure    After pushing for 66 minutes, at 0144 patient delivered a viable male , wt 8# 2oz, apgars of 8 (1 min) and 9 (5 min)  The fetal vertex delivered spontaneously  Baby was checked for nuchal  There was a loose nuchal around the neck that was easily reduced  The anterior shoulder delivered atraumatically with maternal expulsive forces and the assistance of downward traction  The posterior shoulder delivered with maternal expulsive forces and the assistance of upward traction  The remainder of the fetus delivered spontaneously  Upon delivery, the infant was placed on the mothers abdomen and the cord was clamped and cut  Delayed cord clamping was accomplished  The infant was noted to cry spontaneously and was moving all extremities appropriately  There was no evidence for injury  Awaiting nurse resuscitators evaluated the   Arterial and venous cord blood gases and cord blood was collected for analysis  These were promptly sent to the lab  In the immediate post-partum, 30 units of IV pitocin was administered, and the uterus was noted to contract down well with massage and pitocin  The placenta delivered spontaneously at [address] and was noted to have a centrally inserted 3 vessel cord  The vagina, cervix, perineum, and rectum were inspected and there was noted to be a second degree perineal laceration that was repaired with 3-0 vicryl on a CT  At the conclusion of the procedure, all needle, sponge, and instrument counts were noted to be correct  Patient tolerated the procedure well and was allowed to recover in labor and delivery room with family and  before being transferred to the post-partum floor  The attending was present and participated in all key portions of the case  Lucia Trevino MD  2018  2:22 AM           Attending Physician/Surgeon Statement  I was present for and participated in all key surgical aspects of this patient's care    I agree with the resident's documentation as stated above

## 2018-04-08 NOTE — DISCHARGE INSTRUCTIONS
Vaginal Delivery   WHAT YOU SHOULD KNOW:   A vaginal delivery is the birth of your baby through your vagina (birth canal)  AFTER YOU LEAVE:   Medicines:  · NSAIDs  help decrease swelling and pain or fever  This medicine is available with or without a doctor's order  NSAIDs can cause stomach bleeding or kidney problems in certain people  If you take blood thinner medicine, always ask your healthcare provider if NSAIDs are safe for you  Always read the medicine label and follow directions  · Take your medicine as directed  Call your healthcare provider if you think your medicine is not helping or if you have side effects  Tell him if you are allergic to any medicine  Keep a list of the medicines, vitamins, and herbs you take  Include the amounts, and when and why you take them  Bring the list or the pill bottles to follow-up visits  Carry your medicine list with you in case of an emergency  Follow up with your primary healthcare provider:  Most women need to return 6 weeks after a vaginal delivery  Ask about how to care for your wounds or stitches  Write down your questions so you remember to ask them during your visits  Activity:  Rest as much as possible  Try to keep all activities short  You may be able to do some exercise soon after you have your baby  Talk with your primary healthcare provider before you start exercising  If you work outside the home, ask when you can return to your job  Kegel exercises:  Kegel exercises may help your vaginal and rectal muscles heal faster  You can do Kegel exercises by tightening and relaxing the muscles around your vagina  Kegel exercises help make the muscles stronger  Breast care:  When your milk comes in, your breasts may feel full and hard  Ask how to care for your breasts, even if you are not breastfeeding  Constipation:  Do not try to push the bowel movement out if it is too hard   High-fiber foods, extra liquids, and regular exercise can help you prevent constipation  Examples of high-fiber foods are fruit and bran  Prune juice and water are good liquids to drink  Regular exercise helps your digestive system work  You may also be told to take over-the-counter fiber and stool softener medicines  Take these items as directed  Hemorrhoids:  Pregnancy can cause severe hemorrhoids  You may have rectal pain because of the hemorrhoids  Ask how to prevent or treat hemorrhoids  Perineum care: Your perineum is the area between your vagina and anus  Keep the area clean and dry to help it heal and to prevent infection  Wash the area gently with soap and water when you bathe or shower  Rinse your perineum with warm water when you use the toilet  Your primary healthcare provider may suggest you use a warm sitz bath to help decrease pain  A sitz bath is a bathtub or basin filled to hip level  Stay in the sitz bath for 20 to 30 minutes, or as directed  Vaginal discharge: You will have vaginal discharge, called lochia, after your delivery  The lochia is bright red the first day or two after the birth  By the fourth day, the amount decreases, and it turns red-brown  Use a sanitary pad rather than a tampon to prevent a vaginal infection  It is normal to have lochia up to 8 weeks after your baby is born  Monthly periods: Your period may start again within 7 to 12 weeks after your baby is born  If you are breastfeeding, it may take longer for your period to start again  You can still get pregnant again even though you do not have your monthly period  Talk with your primary healthcare provider about a birth control method that will be good for you if you do not want to get pregnant  Mood changes: Many new mothers have some kind of mood changes after delivery  Some of these changes occur because of lack of sleep, hormone changes, and caring for a new baby  Some mood changes can be more serious, such as postpartum depression   Talk with your primary healthcare provider if you feel unable to care for yourself or your baby  Sexual activity:  You may need to avoid sex for 6 to 7 weeks after you have your baby  You may notice you have a decreased desire for sex, or sex may be painful  You may need to use a vaginal lubricant (gel) to help make sex more comfortable  Contact your primary healthcare provider if:   · You have heavy vaginal bleeding that fills 1 or more sanitary pads in 1 hour  · You have a fever  · Your pain does not go away, or gets worse  · The skin between your vagina and rectum is swollen, warm, or red  · You have swollen, hard, or painful breasts  · You feel very sad or depressed  · You feel more tired than usual      · You have questions or concerns about your condition or care  Seek care immediately or call 911 if:   · You have pus or yellow drainage coming from your vagina or wound  · You are urinating very little, or not at all  · Your arm or leg feels warm, tender, and painful  It may look swollen and red  · You feel lightheaded, have sudden and worsening chest pain, or trouble breathing  You may have more pain when you take deep breaths or cough, or you may cough up blood  © 2014 [address] is for End User's use only and may not be sold, redistributed or otherwise used for commercial purposes  All illustrations and images included in CareNotes® are the copyrighted property of [employer]  or [address]  The above information is an  only  It is not intended as medical advice for individual conditions or treatments  Talk to your doctor, nurse or pharmacist before following any medical regimen to see if it is safe and effective for you

## 2018-04-08 NOTE — DISCHARGE SUMMARY
Discharge Summary - Kyra Ceballos 28 y o  female MRN: [id number]    Unit/Bed#: L&D 323-01 Encounter: [id number]    Admission Date: 2018     Discharge Date: 4/10/2018    Discharge Attending: Tiana Mcdonald DO    Principal Diagnosis:   Encounter for full-term uncomplicated delivery [G79]    Secondary Diagnosis:   GBS positive status  Rh negative status     Procedures: Spontaneous Vaginal Delivery    Complications: none apparent    Hospital Course: Ms Kyra Ceballos is a 28 y o   at 40wk5d  She was admitted to labor and delivery for induction of labor on   Penicillin was started for GBS prophylaxis  She made minimal change from 1-50/-2 to 2-3/50/-2 after 1 dose of cytotec and pitocin titration to 14mU/min  At that point, she was given the option to go home and wait for labor vs pit break  She decided to go home and was discharged around 2200  At midnight on , two hours later, she spontaneously ruptured at home  She was admitted to labor and delivery for premature rupture of membranes, with SVE at 3/70/-2  She was restarted on penicillin for GBS prophylaxis  She was started on pitocin titration after not making change with expectant management  An IUPC was placed and amnioinfusion was started due to recurrent variable decelerations during labor  She delivered via spontaneous vaginal delivery with no complications and a second degree laceration that was repaired  She had an uncomplicated postpartum course  Condition at discharge: good     On day of discharge, patient was tolerating PO, passing flatus, urinating, and ambulating  Her pain was well controlled with oral analgesics  She was discharged home on postpartum day #2 with standard post partum instructions to follow up with her physician in 3-6 weeks for a postpartum appointment  Discharge instructions/Information to patient and family:   - Do not place anything (no partner, tampons or douche) in your vagina for 6 weeks    -You may walk for exercise for the first 6 weeks then gradually return to your usual activities    -Please do not drive for 1 week if you have no stitches and for 2 weeks if you have stitches or underwent a  delivery     -You may take baths or shower per your preference    -Please look at your bust (breasts) in the mirror daily and call for redness or tenderness or increased warmth    -Please call us for temperature > 100 4*F or 38* C, worsening pain or a foul discharge       Discharge Medications:   Prenatal vitamin daily for 6 months or the duration of nursing whichever is longer  Motrin 600 mg orally every 6 hours as needed for pain  Tylenol (over the counter) per bottle directions as needed for pain: do NOT use with percocet  Hydrocortisone cream 1% (over the counter) applied 1-2x daily to hemorrhoids as needed    Provisions for Follow-Up Care: Follow up with your doctor in 6 weeks for postpartum care       Planned Readmission: Mirlande Alarcon DO  04/10/18  5:22 AM

## 2018-04-08 NOTE — PLAN OF CARE
Problem: Knowledge Deficit  Goal: Patient/family/caregiver demonstrates understanding of disease process, treatment plan, medications, and discharge instructions  Complete learning assessment and assess knowledge base    Interventions:  - Provide teaching at level of understanding  - Provide teaching via preferred learning methods   Outcome: Progressing      Problem: INFECTION - ADULT  Goal: Absence or prevention of progression during hospitalization  INTERVENTIONS:  - Assess and monitor for signs and symptoms of infection  - Monitor lab/diagnostic results  - Monitor all insertion sites, i e  indwelling lines, tubes, and drains  - Monitor endotracheal (as able) and nasal secretions for changes in amount and color  - [place] appropriate cooling/warming therapies per order  - Administer medications as ordered  - Instruct and encourage patient and family to use good hand hygiene technique  - Identify and instruct in appropriate isolation precautions for identified infection/condition   Outcome: Progressing      Problem: PAIN - ADULT  Goal: Verbalizes/displays adequate comfort level or baseline comfort level  Interventions:  - Encourage patient to monitor pain and request assistance  - Assess pain using appropriate pain scale  - Administer analgesics based on type and severity of pain and evaluate response  - Implement non-pharmacological measures as appropriate and evaluate response  - Consider cultural and social influences on pain and pain management  - Notify physician/advanced practitioner if interventions unsuccessful or patient reports new pain   Outcome: Progressing      Problem: SAFETY ADULT  Goal: Maintain or return to baseline ADL function  INTERVENTIONS:  -  Assess patient's ability to carry out ADLs; assess patient's baseline for ADL function and identify physical deficits which impact ability to perform ADLs (bathing, care of mouth/teeth, toileting, grooming, dressing, etc )  - Assess/evaluate cause of self-care deficits   - Assess range of motion  - Assess patient's mobility; develop plan if impaired  - Assess patient's need for assistive devices and provide as appropriate  - Encourage maximum independence but intervene and supervise when necessary  ¯ Involve family in performance of ADLs  ¯ Assess for home care needs following discharge   ¯ Request OT consult to assist with ADL evaluation and planning for discharge  ¯ Provide patient education as appropriate   Outcome: Progressing    Goal: Maintain or return mobility status to optimal level  INTERVENTIONS:  - Assess patient's baseline mobility status (ambulation, transfers, stairs, etc )    - Identify cognitive and physical deficits and behaviors that affect mobility  - Identify mobility aids required to assist with transfers and/or ambulation (gait belt, sit-to-stand, lift, walker, cane, etc )  - [place] fall precautions as indicated by assessment  - Record patient progress and toleration of activity level on Mobility SBAR; progress patient to next Phase/Stage  - Instruct patient to call for assistance with activity based on assessment  - Request Rehabilitation consult to assist with strengthening/weightbearing, etc    Outcome: Progressing      Problem: DISCHARGE PLANNING  Goal: Discharge to home or other facility with appropriate resources  INTERVENTIONS:  - Identify barriers to discharge w/patient and caregiver  - Arrange for needed discharge resources and transportation as appropriate  - Identify discharge learning needs (meds, wound care, etc )  - Arrange for interpretive services to assist at discharge as needed  - Refer to Case Management Department for coordinating discharge planning if the patient needs post-hospital services based on physician/advanced practitioner order or complex needs related to functional status, cognitive ability, or social support system   Outcome: Progressing

## 2018-04-08 NOTE — OB LABOR/OXYTOCIN SAFETY PROGRESS
Oxytocin Safety Progress Check Note - Cherry Nail 28 y o  female MRN: [id number]    Unit/Bed#: L&D 323-01 Encounter: [id number]    Obstetric History       T1      L1     SAB0   TAB0   Ectopic0   Multiple0   Live Births0      Gestational Age: 39w6d  Dose (roberta-units/min) Oxytocin: 8 roberta-units/min  Contraction Frequency (minutes): 3 5-4  Contraction Quality: Strong  Tachysystole: No   Dilation: Lip/rim (Comment)        Effacement (%): 100  Station: 1  Baseline Rate: 155 bpm  Fetal Heart Rate: 140 BPM  FHR Category: Category I          Notes/comments:     Strip CAT I with early decelerations  Continue pitocin at 8mU/min  Patient feels no urge to push  She just received a bolus from anesthesia  Will continue to monitor and recheck in 1 hour      D/w Dr Geetha Jones MD 2018 11:51 PM

## 2018-04-08 NOTE — OB LABOR/OXYTOCIN SAFETY PROGRESS
OBSTETRIC HUDDLE NOTE  Joe Garcia  [id number]  2018  9:20 PM    Unit/Bed#: L&D 323-01 Encounter: [id number]    Obstetric History       T1      L1     SAB0   TAB0   Ectopic0   Multiple0   Live Births0      Gestational Age: 40w5d  Dose (roberta-units/min) Oxytocin: 8 roberta-units/min  Contraction Frequency (minutes): 3-4  Contraction Quality: Strong  Tachysystole: No   Dilation: 8-9        Effacement (%): 90  Station: -1  Baseline Rate: 160 bpm  Fetal Heart Rate: 140 BPM  FHR Category: Category I          TRIGGER:   Fetal tachycardia with baseline 165-170 from 1765-2125  PARTICIPANTS:   Dr Susannah De Oliveira, Dr Sparkle Tse:   Patient is making slow but progressive cervical change on pitocin of 8mU/min  FHT shows fetal tachycardia with baseline of 165-170 from 3101-2971 with moderate variability and early decelerations  Variable decelerations have resolved with amnioinfusion in place with LR 125cc/hr  Most recent maternal temp is 99 4F at   PLAN:   Continue pitocin at 8mU/min with FHT monitoring  Continue amnioinfusion with monitoring for fluid return  Will recheck in 2 hours  Would like to reduce checks as patient has been ruptured for 21 5 hours but will recheck if patient is feeling significant discomfort sooner than that    D/w team members     Cass Werner MD  2018  9:20 PM

## 2018-04-09 LAB — RPR SER QL: NORMAL

## 2018-04-09 RX ADMIN — IBUPROFEN 600 MG: 600 TABLET ORAL at 13:28

## 2018-04-09 RX ADMIN — DOCUSATE SODIUM 100 MG: 100 CAPSULE, LIQUID FILLED ORAL at 08:08

## 2018-04-09 RX ADMIN — OXYCODONE HYDROCHLORIDE AND ACETAMINOPHEN 1 TABLET: 5; 325 TABLET ORAL at 22:21

## 2018-04-09 RX ADMIN — IBUPROFEN 600 MG: 600 TABLET ORAL at 18:44

## 2018-04-09 RX ADMIN — OXYCODONE HYDROCHLORIDE AND ACETAMINOPHEN 1 TABLET: 5; 325 TABLET ORAL at 06:29

## 2018-04-09 RX ADMIN — DOCUSATE SODIUM 100 MG: 100 CAPSULE, LIQUID FILLED ORAL at 18:45

## 2018-04-09 RX ADMIN — IBUPROFEN 600 MG: 600 TABLET ORAL at 01:52

## 2018-04-09 RX ADMIN — IBUPROFEN 600 MG: 600 TABLET ORAL at 08:19

## 2018-04-09 NOTE — LACTATION NOTE
This note was copied from a baby's chart  CONSULT - LACTATION  Baby Boy  Edmundo Ida) Merlini 1 days male MRN: [id number]    [address] NURSERY Room / Bed: L&D 315(N)/L&D 315(N) Encounter: [id number]    Maternal Information     MOTHER:  Merlini,Nicole  Maternal Age: 28 y o    OB History: #: 1, Date: None, Sex: None, Weight: None, GA: None, Delivery: None, Apgar1: None, Apgar5: None, Living: None, Birth Comments: None    #: 2, Date: 18, Sex: Male, Weight: 3685 g (8 lb 2 oz), GA: 40w6d, Delivery: Vaginal, Spontaneous Delivery, Apgar1: 8, Apgar5: 9, Living: Living, Birth Comments: None   Previouse breast reduction surgery? No    Lactation history:   Has patient previously breast fed: Yes   How long had patient previously breast fed:     Previous breast feeding complications: Low milk supply   No past surgical history on file  Birth information:  YOB: 2019   Time of birth: 1:44 AM   Sex: male   Delivery type: Vaginal, Spontaneous Delivery   Birth Weight: 3685 g (8 lb 2 oz)   Percent of Weight Change: -2%     Gestational Age: 38w9d   [unfilled]    Assessment     Breast and nipple assessment: normal assessment    [place] Assessment: normal assessment    Feeding assessment: feeding well  LATCH:  Latch: Repeated attempts, hold nipple in mouth, stimulate to suck   Audible Swallowing: Spontaneous and intermittent (24 hours old)   Type of Nipple: Everted (After stimulation)   Comfort (Breast/Nipple): Soft/non-tender   Hold (Positioning): Full assist, teach one side, mother does other, staff holds   [facility] Score: 8          Feeding recommendations:  breast feed on demand     Met with mother  Provided mother with Ready, Set, Baby booklet  Discussed Skin to Skin contact an benefits to mom and baby  Talked about the delay of the first bath until baby has adjusted  Spoke about the benefits of rooming in   Feeding on cue and what that means for recognizing infant's hunger  Avoidance of pacifiers for the first month discussed  Talked about exclusive breastfeeding for the first 6 months  Positioning and latch reviewed as well as showing images of other feeding positions  Discussed the properties of a good latch in any position  Reviewed hand/manual expression  Discussed s/s that baby is getting enough milk and some s/s that breastfeeding dyad may need further help  Gave information on common concerns, what to expect the first few weeks after delivery, preparing for other caregivers, and how partners can help  Resources for support also provided  Encouraged MOB to call for assistance, questions, and concerns about breastfeeding  Extension provided      Carrie Connors RN 4/9/2018 9:59 AM

## 2018-04-09 NOTE — MEDICAL STUDENT
Subjective: The patient is a 28 y o  y/o  currently PPD #1 sp   The patient is doing well, reporting some occasional cramping  She denies chills, chest pain, SOB, N/V, dysuria, leg pain      Tolerating PO: yes  Ambulation: yes  Voiding: yes  Flatus: yes  Pain controlled: yes  Lochia: minimal    Objective:  Temp:  [97 6 °F (36 4 °C)-97 9 °F (36 6 °C)] 97 9 °F (36 6 °C)  HR:  [75-86] 86  Resp:  [16-18] 16  BP: (103-129)/(67-80) 129/77        Intake/Output Summary (Last 24 hours) at 18 3305  Last data filed at 18 0800   Gross per 24 hour   Intake                0 ml   Output              300 ml   Net             -300 ml         Physical Exam       Recent Results (from the past 24 hour(s))   Postpartum Rhogam    Collection Time: 18  6:22 PM   Result Value Ref Range    ABO Grouping O     Rh Factor Negative     Antibody Screen Negative     Fetal Bleed Screen Negative          Assessment:  Patient Active Problem List   Diagnosis    Prenatal care in third trimester    40 weeks gestation of pregnancy    Rh negative state in antepartum period       Plan:  Routine postpartum management  Adequate pain control  Ambulation as tolerated  Encourage breastfeeding    CRUZ Green  18, 6:31

## 2018-04-09 NOTE — PLAN OF CARE
Problem: Knowledge Deficit  Goal: Patient/family/caregiver demonstrates understanding of disease process, treatment plan, medications, and discharge instructions  Complete learning assessment and assess knowledge base  Interventions:  - Provide teaching at level of understanding  - Provide teaching via preferred learning methods   Outcome: Adequate for Discharge      Problem: Potential for Falls  Goal: Patient will remain free of falls  INTERVENTIONS:  - Assess patient frequently for physical needs  -  Identify cognitive and physical deficits and behaviors that affect risk of falls    -  [place] fall precautions as indicated by assessment   - Educate patient/family on patient safety including physical limitations  - Instruct patient to call for assistance with activity based on assessment  - Modify environment to reduce risk of injury  - Consider OT/PT consult to assist with strengthening/mobility   Outcome: Completed Date Met: 04/09/18      Problem: INFECTION - ADULT  Goal: Absence or prevention of progression during hospitalization  INTERVENTIONS:  - Assess and monitor for signs and symptoms of infection  - Monitor lab/diagnostic results  - Monitor all insertion sites, i e  indwelling lines, tubes, and drains  - Monitor endotracheal (as able) and nasal secretions for changes in amount and color  - [place] appropriate cooling/warming therapies per order  - Administer medications as ordered  - Instruct and encourage patient and family to use good hand hygiene technique  - Identify and instruct in appropriate isolation precautions for identified infection/condition   Outcome: Completed Date Met: 04/09/18      Problem: PAIN - ADULT  Goal: Verbalizes/displays adequate comfort level or baseline comfort level  Interventions:  - Encourage patient to monitor pain and request assistance  - Assess pain using appropriate pain scale  - Administer analgesics based on type and severity of pain and evaluate response  - Implement non-pharmacological measures as appropriate and evaluate response  - Consider cultural and social influences on pain and pain management  - Notify physician/advanced practitioner if interventions unsuccessful or patient reports new pain   Outcome: Adequate for Discharge      Problem: SAFETY ADULT  Goal: Maintain or return to baseline ADL function  INTERVENTIONS:  -  Assess patient's ability to carry out ADLs; assess patient's baseline for ADL function and identify physical deficits which impact ability to perform ADLs (bathing, care of mouth/teeth, toileting, grooming, dressing, etc )  - Assess/evaluate cause of self-care deficits   - Assess range of motion  - Assess patient's mobility; develop plan if impaired  - Assess patient's need for assistive devices and provide as appropriate  - Encourage maximum independence but intervene and supervise when necessary  ¯ Involve family in performance of ADLs  ¯ Assess for home care needs following discharge   ¯ Request OT consult to assist with ADL evaluation and planning for discharge  ¯ Provide patient education as appropriate   Outcome: Adequate for Discharge    Goal: Maintain or return mobility status to optimal level  INTERVENTIONS:  - Assess patient's baseline mobility status (ambulation, transfers, stairs, etc )    - Identify cognitive and physical deficits and behaviors that affect mobility  - Identify mobility aids required to assist with transfers and/or ambulation (gait belt, sit-to-stand, lift, walker, cane, etc )  - [place] fall precautions as indicated by assessment  - Record patient progress and toleration of activity level on Mobility SBAR; progress patient to next Phase/Stage  - Instruct patient to call for assistance with activity based on assessment  - Request Rehabilitation consult to assist with strengthening/weightbearing, etc    Outcome: Adequate for Discharge      Problem: DISCHARGE PLANNING  Goal: Discharge to home or other facility with appropriate resources  INTERVENTIONS:  - Identify barriers to discharge w/patient and caregiver  - Arrange for needed discharge resources and transportation as appropriate  - Identify discharge learning needs (meds, wound care, etc )  - Arrange for interpretive services to assist at discharge as needed  - Refer to Case Management Department for coordinating discharge planning if the patient needs post-hospital services based on physician/advanced practitioner order or complex needs related to functional status, cognitive ability, or social support system   Outcome: Adequate for Discharge

## 2018-04-09 NOTE — PROGRESS NOTES
Progress Note - Obstetrics  Post-Partum Physician Note   Kamini Javed 28 y o  female MRN: [id number]  Unit/Bed#: L&D 315-01 Encounter: [id number]    ASSESSMENT:  Postpartum day # 1 status post Spontaneous Vaginal Delivery      PLAN:  1) Continue Routine Postpartum Care  2) Encourage Ambulation  3) Advance diet as tolerated  4) Anticipate d/c home tomorrow (GBS+)    SUBJECTIVE:    Pain: cramping with breastfeeding only  Tolerating Oral Intake: is tolerating PO liquids and solids  Voiding: yes - spontaneously  Flatus: yes  Bowel Movement: no  Ambulating: yes  Breastfeeding: Breastfeeding  Chest Pain: no  Shortness of Breath: no  Leg Pain/Discomfort: no  Lochia: minimal        OBJECTIVE:     Vitals:   Vitals:    04/08/18 1100 04/08/18 1447 04/08/18 1906 04/08/18 2306   BP: 106/69 113/70 122/80 129/77   BP Location: Right arm Right arm Right arm Left arm   Pulse: 77 77 75 86   Resp: 16 16 18 16   Temp: 97 6 °F (36 4 °C) 97 6 °F (36 4 °C) 97 6 °F (36 4 °C) 97 9 °F (36 6 °C)   TempSrc: Oral Oral Oral Oral   SpO2:       Weight:       Height:           I/O       04/07 0701 - 04/08 0700 04/08 0701 - 04/09 0700    I V  (mL/kg) 1925 (21 4)     IV Piggyback 400     Total Intake(mL/kg) 2325 (25 9)     Urine (mL/kg/hr) 1975 (0 9) 300 (0 1)    Total Output 1975 300    Net +350 -300                  Results from last 7 days  Lab Units 04/06/18  0939   WBC Thousand/uL 8 29   NEUTROS ABS Thousands/µL 5 73   HEMOGLOBIN g/dL 11 6   MCV fL 82   PLATELETS Thousands/uL 228       Results from last 7 days  Lab Units 04/06/18  0939   NEUTROS PCT % 70   LYMPHS PCT % 22   MONOS PCT % 7   EOS PCT % 1                         Physical Exam:  Physical exam:   General: No Acute Distress   Cardiovascular: Regular Rate and Rhythm   Lungs: Clear to Auscultation Bilaterally, no wheezing, rhonchi or rales   Abdomen: non-tender, no rebound or guarding;     Fundus: Firm    Fundal Location: +1 cm above the umbilicus   Lower Extremities: negative Olena's sign bilaterally   Neuro: Alert, cooperative          MEDS:   Medication Administration - last 24 hours from 04/08/2018 0623 to 04/09/2018 7384       Date/Time Order Dose Route Action Action by     04/09/2018 0152 ibuprofen (MOTRIN) tablet 600 mg 600 mg Oral Given Autumn Brim, RN     04/08/2018 2057 ibuprofen (MOTRIN) tablet 600 mg 600 mg Oral Given Sarahi Brim, RN     04/08/2018 1655 ibuprofen (MOTRIN) tablet 600 mg 600 mg Oral Given Robertoleonardo Consuelo, RN     04/08/2018 1215 ibuprofen (MOTRIN) tablet 600 mg 600 mg Oral Given Lola John, RN     04/08/2018 9676 ibuprofen (MOTRIN) tablet 600 mg 600 mg Oral Given Lola Lopez, RN     04/08/2018 2036 docusate sodium (COLACE) capsule 100 mg 100 mg Oral Not Given Autumn Brim, RN     04/08/2018 1655 docusate sodium (COLACE) capsule 100 mg 100 mg Oral Given Josr Cordero, RN     04/08/2018 1215 docusate sodium (COLACE) capsule 100 mg 100 mg Oral Given Lola Lopez, RN     04/08/2018 0811 benzocaine-menthol-lanolin-aloe (DERMOPLAST) 20-0 5 % topical spray 1 application 1 application Topical Given Lola Lopez, RN     04/08/2018 2038 Rho(D) immune globulin (RHOGAM ULTRA-FILTERED PLUS) IM injection 300 mcg 300 mcg Intramuscular Given Autumn Brim, RN        Current Facility-Administered Medications   Medication Dose Route Frequency    acetaminophen (TYLENOL) tablet 650 mg  650 mg Oral Q4H PRN    aluminum-magnesium hydroxide-simethicone (MYLANTA) 200-200-20 mg/5 mL oral suspension 15 mL  15 mL Oral Q6H PRN    benzocaine-menthol-lanolin-aloe (DERMOPLAST) 20-0 5 % topical spray 1 application  1 application Topical 4x Daily PRN    calcium carbonate (TUMS) chewable tablet 1,000 mg  1,000 mg Oral Daily PRN    diphenhydrAMINE (BENADRYL) tablet 25 mg  25 mg Oral Q6H PRN    docusate sodium (COLACE) capsule 100 mg  100 mg Oral BID    hydrocortisone 1 % cream 1 application  1 application Topical PRN    ibuprofen (MOTRIN) tablet 600 mg  600 mg Oral Q4H PRN    ondansetron (ZOFRAN) injection 4 mg  4 mg Intravenous Q8H PRN    oxyCODONE-acetaminophen (PERCOCET) 5-325 mg per tablet 2 tablet  2 tablet Oral Q4H PRN    simethicone (MYLICON) chewable tablet 80 mg  80 mg Oral 4x Daily PRN    witch hazel-glycerin (TUCKS) topical pad 1 pad  1 pad Topical Q4H PRN     Invasive Devices          No matching active lines, drains, or airways              Signature / Title: Alyssa Kebede MD, Resident - Ob/Gyn

## 2018-04-09 NOTE — SOCIAL WORK
CM met with MOB and FOB, Donato Barakat, at bedside  MOB delivered baby boy, Tomasa Jarquin, at 40 weeks 6 days  She is now  2, para 2 and has a 3year old son  Parents reported having all necessary items to care for baby at home, including crib and car seat  Baby is being   MOB already has pump  Ped will be [facility]  MOB had prenatal care throughout her pregnancy  Parents reported having support system through family and friends  FOB stated they would not be eligible for [facility]  CM reminded parents to notify insurance company within 30 days of baby's birth to avoid any gaps in coverage  MOB denied any D&A or MH hx  She did report that she believes she suffered from postpartum depression following the birth of her son, but it went undiagnosed and untreated  CM discussed with her warning signs of postpartum depression and encouraged her to call doctor if she was not feeling well  No CM dc concerns/needs at the time

## 2018-04-10 VITALS
SYSTOLIC BLOOD PRESSURE: 129 MMHG | HEART RATE: 98 BPM | DIASTOLIC BLOOD PRESSURE: 84 MMHG | BODY MASS INDEX: 33.8 KG/M2 | TEMPERATURE: 98.1 F | WEIGHT: 198 LBS | RESPIRATION RATE: 18 BRPM | OXYGEN SATURATION: 99 % | HEIGHT: 64 IN

## 2018-04-10 RX ORDER — IBUPROFEN 600 MG/1
600 TABLET ORAL EVERY 6 HOURS PRN
Qty: 30 TABLET | Refills: 0
Start: 2018-04-10

## 2018-04-10 RX ORDER — ACETAMINOPHEN 325 MG/1
650 TABLET ORAL EVERY 4 HOURS PRN
Qty: 30 TABLET | Refills: 0
Start: 2018-04-10

## 2018-04-10 RX ADMIN — IBUPROFEN 600 MG: 600 TABLET ORAL at 05:51

## 2018-04-10 RX ADMIN — DOCUSATE SODIUM 100 MG: 100 CAPSULE, LIQUID FILLED ORAL at 11:07

## 2018-04-10 NOTE — PROGRESS NOTES
Postpartum Note    Patient is post-op day 2 from a  delivery  Pain: no  Tolerating Oral Intake: yes  Voiding: yes  Flatus: yes  Bowel Movement: yes  Ambulating: yes  Breastfeeding: Breastfeeding  Chest Pain: no  Shortness of Breath: no  Leg Pain/Discomfort: no  Lochia: minimal    Objective:   Vitals:    18 2300   BP: 103/71   Pulse: 79   Resp: 18   Temp: (!) 97 1 °F (36 2 °C)   SpO2:      No intake or output data in the 24 hours ending 04/10/18 0523    Physical Exam:  General: in no apparent distress, well developed and well nourished, alert and cooperative  Cardiovascular: Deferred  Lungs: clear to auscultation bilaterally and Deferred  Abdomen: abdomen is soft without significant tenderness, masses, organomegaly or guarding  Fundus: Firm and non-tender, 2 cm below the umbilicus  Lower extremities: nontender    Labs/Tests:   I have reviewed all lab results which are normal or stable  Assessment and Plan:  28y o  year-old , postpartum day 2 status-post  delivery  Discharge home today with follow-up for routine postpartum care in 6 wks      Tiana Mcdonald DO  04/10/18  5:25 AM

## 2018-04-10 NOTE — NURSING NOTE
Reviewed with pt that she was not immune to rubella and offered the MMR immunization at this time  PT stated understanding but denied the immunization and stated that she would follow up with her PCP and OB once she was discharged

## 2018-04-10 NOTE — LACTATION NOTE
This note was copied from a baby's chart  Met with mother to go over feeding log since birth for the first week  Emphasized 8 or more (12) feedings in a 24 hour period, what to expect for the number of diapers per day of life and the progression of properties of the  stooling pattern  Discussed s/s that breastfeeding is going well after day 4 and when to get help from a pediatrician or lactation support person after day 4  Booklet included Breast Pumping Instructions, When You Go Back to Work or School, and Breastfeeding Resources for after discharge  Mother verbalized breastfeeding is going well  I verb some concern about the baby's output, but reassured mom the baby's weight loss is well within normal  Reinforced the importance of watching baby's output closely for the next few days and the importance of seeing the peds within a day or two of discharge  I also asked mom to call me for the next feeding to observe a latch one more time before discharge  Enc to call for assistance as needed,phone # given

## 2018-04-10 NOTE — PLAN OF CARE
DISCHARGE PLANNING     Discharge to home or other facility with appropriate resources Progressing        DISCHARGE PLANNING - CARE MANAGEMENT     Discharge to post-acute care or home with appropriate resources Progressing        Knowledge Deficit     Patient/family/caregiver demonstrates understanding of disease process, treatment plan, medications, and discharge instructions Progressing        PAIN - ADULT     Verbalizes/displays adequate comfort level or baseline comfort level Progressing        SAFETY ADULT     Maintain or return to baseline ADL function Progressing     Maintain or return mobility status to optimal level Progressing

## 2018-04-10 NOTE — PLAN OF CARE

## 2018-04-10 NOTE — LACTATION NOTE
This note was copied from a baby's chart  Mom called me to observe a feeding  Baby latched fairly well, better than yesterday  Baby still pulls off and on breast quite frequent  I demo  to mom how to do breast compression and enc her to hand express a drop before latching every time he pulls off and do breast compression during feeding  Enc mom to get follow up help at [facility] if she has any further concerns with breastfeeding

## 2018-04-15 LAB — PLACENTA IN STORAGE: NORMAL

## 2024-04-22 ENCOUNTER — OFFICE VISIT (OUTPATIENT)
Dept: URGENT CARE | Age: 39
End: 2024-04-22
Payer: COMMERCIAL

## 2024-04-22 VITALS
SYSTOLIC BLOOD PRESSURE: 148 MMHG | DIASTOLIC BLOOD PRESSURE: 100 MMHG | HEART RATE: 84 BPM | OXYGEN SATURATION: 100 % | RESPIRATION RATE: 20 BRPM | TEMPERATURE: 98 F

## 2024-04-22 DIAGNOSIS — R07.89 OTHER CHEST PAIN: Primary | ICD-10-CM

## 2024-04-22 LAB
ATRIAL RATE: 79 BPM
P AXIS: 45 DEGREES
PR INTERVAL: 168 MS
QRS AXIS: 66 DEGREES
QRSD INTERVAL: 82 MS
QT INTERVAL: 398 MS
QTC INTERVAL: 456 MS
T WAVE AXIS: 34 DEGREES
VENTRICULAR RATE: 79 BPM

## 2024-04-22 PROCEDURE — G0383 LEV 4 HOSP TYPE B ED VISIT: HCPCS | Performed by: STUDENT IN AN ORGANIZED HEALTH CARE EDUCATION/TRAINING PROGRAM

## 2024-04-22 PROCEDURE — 93010 ELECTROCARDIOGRAM REPORT: CPT | Performed by: INTERNAL MEDICINE

## 2024-04-22 PROCEDURE — S9083 URGENT CARE CENTER GLOBAL: HCPCS | Performed by: STUDENT IN AN ORGANIZED HEALTH CARE EDUCATION/TRAINING PROGRAM

## 2024-04-22 PROCEDURE — 93005 ELECTROCARDIOGRAM TRACING: CPT | Performed by: STUDENT IN AN ORGANIZED HEALTH CARE EDUCATION/TRAINING PROGRAM

## 2024-04-22 RX ORDER — IBUPROFEN 600 MG/1
600 TABLET ORAL ONCE
Status: COMPLETED | OUTPATIENT
Start: 2024-04-22 | End: 2024-04-22

## 2024-04-22 RX ADMIN — IBUPROFEN 600 MG: 600 TABLET ORAL at 12:30

## 2024-04-22 NOTE — PROGRESS NOTES
[facility]        NAME: Nicole Lee Merlini is a 39 y.o. female  : 1985    MRN: [id number]  DATE: 2024  TIME: 12:29 PM    Assessment and Plan   Other chest pain [R07.89]  1. Other chest pain  ibuprofen (MOTRIN) tablet 600 mg      EKG sinus arrhythmia, rate of 79, no acute ischemic changes.  Pain is reproducible with palpation of her left chest wall, left posterior shoulder, left tricep area.  Discussed that there is very low risk for cardiac cause and her symptoms are much more consistent with MSK cause.  However, discussed that we cannot fully rule out cardiac cause without blood work.  Given option of ER now versus Motrin for muscular pain, to ER if any worsening or not improving.  She prefers to start with the second option.  She will go to the ER if not improving or worsening in any way.      Patient Instructions   Today you were evaluated for chest pain.  I believe that your pain is most likely due to a muscular strain as you are tender when I touched the muscles adjacent to your sternum, on the back of the shoulder, and around the tricep on the left side.  You do not have this tenderness on the right side.  You are given a dose of Motrin in the office today which should help with this.  You can continue alternating Tylenol and Motrin as well as using ice to help with muscular pain.  Your EKG did not show signs of a heart attack.  We discussed that we cannot completely rule out cardiac cause without blood work.  We discussed that to get the blood  work, you would need to proceed to the ER.  With very low suspicion of a cardiac cause, you chose to start with the Motrin, you will monitor your symptoms closely, if your symptoms are worsening in any way or not improving with the Motrin, you will proceed to the ER.    Please also follow-up with your PCP.    Follow up with PCP in 3-5 days.  Proceed to  ER if symptoms worsen.    If tests have been performed at [facility], our office will  contact you with results if changes need to be made to the care plan discussed with you at the visit.  You can review your full results on [facility].    Chief Complaint     Chief Complaint   Patient presents with    Chest Pain     Symptoms started this morning and is now radiating down her left arm, between shoulder blades         History of Present Illness       Patient presents for chest pain which started on the left side of her chest this morning while she was sitting at work.  She describes it as a dull pain, she also started to feel some pain in her posterior left shoulder blade and in her left arm.  She felt a couple episodes of her heart going faster, she is not currently feeling palpitations.  No associated shortness of breath.  No personal cardiac history.  She does have history of cardiac disease on her dad side, to her knowledge no one started having cardiac issues until around age 60, her dad had a stent and grandmother also had CAD, but not until later in life to her knowledge.  She does not recall any specific injury or new activities.  She does have worsening of the pain with movement of flexing her neck down.        Review of Systems   Review of Systems   All other systems reviewed and are negative.        Current Medications       Current Outpatient Medications:     acetaminophen (TYLENOL) 325 mg tablet, Take 2 tablets (650 mg total) by mouth every 4 (four) hours as needed for mild pain, headaches or fever (Patient not taking: Reported on 4/22/2024), Disp: 30 tablet, Rfl: 0    acetaminophen-codeine (TYLENOL/CODEINE #3) 300-30 MG per tablet, Take 2 tablets by mouth every 4 (four) hours as needed for moderate pain. (Patient not taking: Reported on 4/22/2024), Disp: , Rfl:     ibuprofen (MOTRIN) 600 mg tablet, Take 1 tablet (600 mg total) by mouth every 6 (six) hours as needed for moderate pain (Patient not taking: Reported on 4/22/2024), Disp: 30 tablet, Rfl: 0    Prenatal Multivit-Min-Fe-FA  (PRENATAL 1 + IRON PO), Take by mouth (Patient not taking: Reported on 4/22/2024), Disp: , Rfl:     Prenatal Vit-Fe Fumarate-FA (PRENATAL VITAMIN PO), Take 1 tablet by mouth daily. (Patient not taking: Reported on 4/22/2024), Disp: , Rfl:     Current Facility-Administered Medications:     ibuprofen (MOTRIN) tablet 600 mg, 600 mg, Oral, Once,     Current Allergies     Allergies as of 04/22/2024    (No Known Allergies)            The following portions of the patient's history were reviewed and updated as appropriate: allergies, current medications, past family history, past medical history, past social history, past surgical history and problem list.     No past medical history on file.    Past Surgical History:   Procedure Laterality Date    ADENOIDECTOMY      WISDOM TOOTH EXTRACTION         No family history on file.      Medications have been verified.        Objective   /100   Pulse 84   Temp 98 °F (36.7 °C)   Resp 20   LMP 04/09/2024 (Exact Date)   SpO2 100%   Patient's last menstrual period was 04/09/2024 (exact date).       Physical Exam     Physical Exam  Vitals and nursing note reviewed.   Constitutional:       General: She is not in acute distress.     Appearance: Normal appearance. She is not ill-appearing or toxic-appearing.   HENT:      Head: Normocephalic and atraumatic.      Right Ear: External ear normal.      Left Ear: External ear normal.      Nose: Nose normal.      Mouth/Throat:      Mouth: Mucous membranes are moist.   Eyes:      Extraocular Movements: Extraocular movements intact.   Cardiovascular:      Rate and Rhythm: Normal rate and regular rhythm.      Heart sounds: Normal heart sounds. No murmur heard.  Pulmonary:      Effort: Pulmonary effort is normal. No respiratory distress.      Breath sounds: Normal breath sounds. No stridor. No wheezing, rhonchi or rales.   Musculoskeletal:         General: Tenderness present.      Right lower leg: No edema.      Left lower leg: No edema.       Comments: Reproducible tenderness along left sternal border, not present on right sternal border  .  Tenderness just medial to her left scapula  Tender about left tricep   Skin:     General: Skin is warm and dry.      Capillary Refill: Capillary refill takes less than 2 seconds.      Findings: No rash.   Neurological:      Mental Status: She is alert.      Gait: Gait normal.   Psychiatric:         Behavior: Behavior normal.

## 2024-04-22 NOTE — PATIENT INSTRUCTIONS
Today you were evaluated for chest pain.  I believe that your pain is most likely due to a muscular strain as you are tender when I touched the muscles adjacent to your sternum, on the back of the shoulder, and around the tricep on the left side.  You do not have this tenderness on the right side.  You are given a dose of Motrin in the office today which should help with this.  You can continue alternating Tylenol and Motrin as well as using ice to help with muscular pain.  Your EKG did not show signs of a heart attack.  We discussed that we cannot completely rule out cardiac cause without blood work.  We discussed that to get the blood  work, you would need to proceed to the ER.  With very low suspicion of a cardiac cause, you chose to start with the Motrin, you will monitor your symptoms closely, if your symptoms are worsening in any way or not improving with the Motrin, you will proceed to the ER.    Please also follow-up with your PCP.